# Patient Record
Sex: FEMALE | Race: ASIAN | NOT HISPANIC OR LATINO | Employment: UNEMPLOYED | URBAN - METROPOLITAN AREA
[De-identification: names, ages, dates, MRNs, and addresses within clinical notes are randomized per-mention and may not be internally consistent; named-entity substitution may affect disease eponyms.]

---

## 2021-06-03 ENCOUNTER — RECORDS - HEALTHEAST (OUTPATIENT)
Dept: ADMINISTRATIVE | Facility: CLINIC | Age: 46
End: 2021-06-03

## 2023-11-16 ENCOUNTER — OFFICE VISIT (OUTPATIENT)
Dept: FAMILY MEDICINE | Facility: CLINIC | Age: 48
End: 2023-11-16

## 2023-11-16 VITALS
TEMPERATURE: 98.1 F | HEIGHT: 61 IN | OXYGEN SATURATION: 99 % | HEART RATE: 91 BPM | RESPIRATION RATE: 16 BRPM | BODY MASS INDEX: 27.23 KG/M2 | SYSTOLIC BLOOD PRESSURE: 118 MMHG | DIASTOLIC BLOOD PRESSURE: 70 MMHG | WEIGHT: 144.25 LBS

## 2023-11-16 DIAGNOSIS — Z29.89 NEED FOR MALARIA PROPHYLAXIS: ICD-10-CM

## 2023-11-16 DIAGNOSIS — Z71.84 ENCOUNTER FOR COUNSELING FOR TRAVEL: ICD-10-CM

## 2023-11-16 DIAGNOSIS — Z00.00 HEALTH CARE MAINTENANCE: ICD-10-CM

## 2023-11-16 DIAGNOSIS — E66.3 OVERWEIGHT (BMI 25.0-29.9): ICD-10-CM

## 2023-11-16 DIAGNOSIS — Z23 NEED FOR VACCINATION: ICD-10-CM

## 2023-11-16 DIAGNOSIS — A09 TRAVELER'S DIARRHEA: ICD-10-CM

## 2023-11-16 DIAGNOSIS — Z76.89 ENCOUNTER TO ESTABLISH CARE: Primary | ICD-10-CM

## 2023-11-16 LAB
ALBUMIN SERPL BCG-MCNC: 4.1 G/DL (ref 3.5–5.2)
ALP SERPL-CCNC: 62 U/L (ref 40–150)
ALT SERPL W P-5'-P-CCNC: 38 U/L (ref 0–50)
ANION GAP SERPL CALCULATED.3IONS-SCNC: 9 MMOL/L (ref 7–15)
AST SERPL W P-5'-P-CCNC: 38 U/L (ref 0–45)
BASOPHILS # BLD AUTO: 0 10E3/UL (ref 0–0.2)
BASOPHILS NFR BLD AUTO: 1 %
BILIRUB DIRECT SERPL-MCNC: <0.2 MG/DL (ref 0–0.3)
BILIRUB SERPL-MCNC: 0.5 MG/DL
BUN SERPL-MCNC: 9.8 MG/DL (ref 6–20)
CALCIUM SERPL-MCNC: 9 MG/DL (ref 8.6–10)
CHLORIDE SERPL-SCNC: 105 MMOL/L (ref 98–107)
CHOLEST SERPL-MCNC: 229 MG/DL
CREAT SERPL-MCNC: 0.54 MG/DL (ref 0.51–0.95)
DEPRECATED HCO3 PLAS-SCNC: 24 MMOL/L (ref 22–29)
EGFRCR SERPLBLD CKD-EPI 2021: >90 ML/MIN/1.73M2
EOSINOPHIL # BLD AUTO: 0.2 10E3/UL (ref 0–0.7)
EOSINOPHIL NFR BLD AUTO: 4 %
ERYTHROCYTE [DISTWIDTH] IN BLOOD BY AUTOMATED COUNT: 13.2 % (ref 10–15)
GLUCOSE SERPL-MCNC: 87 MG/DL (ref 70–99)
HCT VFR BLD AUTO: 39.6 % (ref 35–47)
HDLC SERPL-MCNC: 53 MG/DL
HGB BLD-MCNC: 13.4 G/DL (ref 11.7–15.7)
IMM GRANULOCYTES # BLD: 0 10E3/UL
IMM GRANULOCYTES NFR BLD: 0 %
LDLC SERPL CALC-MCNC: 153 MG/DL
LYMPHOCYTES # BLD AUTO: 1.7 10E3/UL (ref 0.8–5.3)
LYMPHOCYTES NFR BLD AUTO: 36 %
MCH RBC QN AUTO: 30.6 PG (ref 26.5–33)
MCHC RBC AUTO-ENTMCNC: 33.8 G/DL (ref 31.5–36.5)
MCV RBC AUTO: 90 FL (ref 78–100)
MONOCYTES # BLD AUTO: 0.4 10E3/UL (ref 0–1.3)
MONOCYTES NFR BLD AUTO: 8 %
NEUTROPHILS # BLD AUTO: 2.5 10E3/UL (ref 1.6–8.3)
NEUTROPHILS NFR BLD AUTO: 52 %
NONHDLC SERPL-MCNC: 176 MG/DL
PLATELET # BLD AUTO: 229 10E3/UL (ref 150–450)
POTASSIUM SERPL-SCNC: 3.7 MMOL/L (ref 3.4–5.3)
PROT SERPL-MCNC: 7.9 G/DL (ref 6.4–8.3)
RBC # BLD AUTO: 4.38 10E6/UL (ref 3.8–5.2)
SODIUM SERPL-SCNC: 138 MMOL/L (ref 135–145)
TRIGL SERPL-MCNC: 116 MG/DL
TSH SERPL DL<=0.005 MIU/L-ACNC: 1.4 UIU/ML (ref 0.3–4.2)
WBC # BLD AUTO: 4.9 10E3/UL (ref 4–11)

## 2023-11-16 PROCEDURE — 90686 IIV4 VACC NO PRSV 0.5 ML IM: CPT | Performed by: FAMILY MEDICINE

## 2023-11-16 PROCEDURE — 82248 BILIRUBIN DIRECT: CPT | Performed by: FAMILY MEDICINE

## 2023-11-16 PROCEDURE — 90714 TD VACC NO PRESV 7 YRS+ IM: CPT | Performed by: FAMILY MEDICINE

## 2023-11-16 PROCEDURE — 84443 ASSAY THYROID STIM HORMONE: CPT | Performed by: FAMILY MEDICINE

## 2023-11-16 PROCEDURE — 90480 ADMN SARSCOV2 VAC 1/ONLY CMP: CPT | Performed by: FAMILY MEDICINE

## 2023-11-16 PROCEDURE — 90472 IMMUNIZATION ADMIN EACH ADD: CPT | Performed by: FAMILY MEDICINE

## 2023-11-16 PROCEDURE — 80061 LIPID PANEL: CPT | Performed by: FAMILY MEDICINE

## 2023-11-16 PROCEDURE — 90471 IMMUNIZATION ADMIN: CPT | Performed by: FAMILY MEDICINE

## 2023-11-16 PROCEDURE — 36415 COLL VENOUS BLD VENIPUNCTURE: CPT | Performed by: FAMILY MEDICINE

## 2023-11-16 PROCEDURE — 90691 TYPHOID VACCINE IM: CPT | Performed by: FAMILY MEDICINE

## 2023-11-16 PROCEDURE — 85025 COMPLETE CBC W/AUTO DIFF WBC: CPT | Performed by: FAMILY MEDICINE

## 2023-11-16 PROCEDURE — 99401 PREV MED CNSL INDIV APPRX 15: CPT | Mod: 25 | Performed by: FAMILY MEDICINE

## 2023-11-16 PROCEDURE — 91320 SARSCV2 VAC 30MCG TRS-SUC IM: CPT | Performed by: FAMILY MEDICINE

## 2023-11-16 PROCEDURE — 80053 COMPREHEN METABOLIC PANEL: CPT | Performed by: FAMILY MEDICINE

## 2023-11-16 RX ORDER — ATOVAQUONE AND PROGUANIL HYDROCHLORIDE 250; 100 MG/1; MG/1
1 TABLET, FILM COATED ORAL DAILY
Qty: 44 TABLET | Refills: 0 | Status: SHIPPED | OUTPATIENT
Start: 2023-11-17 | End: 2023-12-31

## 2023-11-16 RX ORDER — AZITHROMYCIN 250 MG/1
500 TABLET, FILM COATED ORAL DAILY
Qty: 24 TABLET | Refills: 0 | Status: SHIPPED | OUTPATIENT
Start: 2023-11-16

## 2023-11-16 NOTE — PROGRESS NOTES
OFFICE VISIT    Assessment/Plan:     Patient Instructions:    -Please take the medications as prescribed.  -Follow the recommendations as discussed.  -Reviewed the CDC travel guidelines as printed and given to you.    Please seek immediate medical attention (go to the emergency room or urgent care) for the following reasons: worsening symptoms, or any concerning changes.      Higniio was seen today for [facility] and travel clinic.  Diagnoses and all orders for this visit:    Encounter to establish care  Health care maintenance  Overweight (BMI 25.0-29.9)  -     Basic metabolic panel; Future  -     CBC with Platelets & Differential; Future  -     Hepatic function panel; Future  -     Lipid panel reflex to direct LDL Non-fasting; Future  -     TSH with free T4 reflex; Future    Encounter for counseling for travel  -     atovaquone-proguanil (MALARONE) 250-100 MG tablet; Take 1 tablet by mouth daily for 44 days Start 2 days before travel and continue 7 days after return.  -     azithromycin (ZITHROMAX) 250 MG tablet; Take 2 tablets (500 mg) by mouth daily For 3 days at onset of diarrhea.    Need for malaria prophylaxis  -     atovaquone-proguanil (MALARONE) 250-100 MG tablet; Take 1 tablet by mouth daily for 44 days Start 2 days before travel and continue 7 days after return.    Traveler's diarrhea  -     azithromycin (ZITHROMAX) 250 MG tablet; Take 2 tablets (500 mg) by mouth daily For 3 days at onset of diarrhea.    Need for vaccination  -     INFLUENZA VACCINE IM > 6 MONTHS VALENT IIV4 (AFLURIA/FLUZONE)  -     COVID-19 12+ (2023-24) (PFIZER)  -     TYPHOID VACCINE, IM  -     TD,PF 7+(TENIVAC)        Return in about 2 months (around 1/16/2024) for Annual Physical.    The diagnoses, treatment options, risk, benefits, and recommendations were reviewed with patient/guardian.  Questions were answered to patient's/guardian satisfaction.  Red flag signs were reviewed.  Patient/guardian is in agreement with above  plan.      Subjective: 48 year old female who does not see doctors often and otherwise healthy who presents to clinic for the following complaints:   Patient presents with:  Establish Care  Travel Clinic: [facility] 11/19/23 to 12/24/23    Patient presents with son.   Patient is originally from [facility] then moved to [facility] in 2009.   Moved to [place] in 2010. She saw Dr. Rivera 13 years ago and has not seen anyone else since then.       Medical history:   -Denies history of CAD, diabetes, hypertension.    Medications: None.    Surgical history:   -Denies previous surgery.      Family history:  -Denies family history of CAD, diabetes, hypertension.       They will be travelling to [facility] as above.  They will be going to [facility] in the [place].    They will be interacting with animals (maybe pigs, chickens, farm animals). Discussed animals that carry rabies and related precautions.     Discussed traveller's diarrhea, malaria, rabies.   Discussed recommendations as listed on the [facility] traveller's guide to patient's destination.     Reviewed routine vaccination recommendations.     HM due was reviewed with patient/parent.  Recommendations, risk, benefits were reviewed.  Accepted recommendations were ordered.  Otherwise, patient/parent declined.    Health Maintenance Due   Topic Date Due    YEARLY PREVENTIVE VISIT  Never done    ANNUAL REVIEW OF HM ORDERS  Never done    ADVANCE CARE PLANNING  Never done    MAMMO SCREENING  Never done    COLORECTAL CANCER SCREENING  Never done    HIV SCREENING  Never done    HEPATITIS C SCREENING  Never done    PAP  06/02/2013    DTAP/TDAP/TD IMMUNIZATION (3 - Td or Tdap) 04/26/2021    LIPID  Never done    INFLUENZA VACCINE (1) 09/01/2023    COVID-19 Vaccine (4 - 2023-24 season) 09/01/2023     Discussed typhoid.    Immunization History   Administered Date(s) Administered    COVID-19 Bivalent 12+ (Pfizer) 11/06/2022    COVID-19 Monovalent 18+ (Moderna) 06/10/2021, 07/08/2021  "   Flu, Unspecified 01/16/2011    HepA, Unspecified 08/07/2009, 02/18/2011    HepB, Unspecified 08/07/2009, 06/02/2010, 02/18/2011    MMR 08/07/2009, 06/02/2010    TDAP (Adacel,Boostrix) 06/02/2010    Td,adult,historic,unspecified 08/07/2009, 04/26/2011    Varicella 08/07/2009, 06/02/2010           Malaria prophylaxis recommendations: (based on location and strain of malaria)    https://wwwnc.cdc.gov/travel/yellowbook/2020/umdulzerc-efaujvpmkegbv-elscrdxkt/yellow-fever-vaccine-and-malaria-prophylaxis-information-by-country#ftyiuhe467      Patient presents with son, Renata. Declined professional  and elects to have Renata . Discussed with patient.     The 10 point review of system is negative except as stated in the HPI.    Allergies were reviewed and updated.    Objective:   /70   Pulse 91   Temp 98.1  F (36.7  C) (Oral)   Resp 16   Ht 1.547 m (5' 0.91\")   Wt 65.4 kg (144 lb 4 oz)   LMP 11/05/2023 (Approximate)   SpO2 99%   BMI 27.34 kg/m    General: Active, alert, nontoxic-appearing.  No acute distress.  HEENT: Normocephalic, atraumatic.  Pupils are equal and round.  Sclera is clear.  Normal external ears. Nares patent.  Moist mucous membranes.    Cardiac: RRR.  S1, S2 present.  No murmurs, rubs, or gallops.  Respiratory/chest: Clear to auscultation bilaterally.  No wheezes, rales, rhonchi.  Breathing is not labored.  No accessory muscle usage.  Abdomen: Soft, nondistended, nontender.  No masses or organomegaly noted.  No guarding or rebound tenderness appreciated.  Extremities: Voluntary movements intact.  Integumentary: No concerning rash or skin changes appreciated.    Amount of time spent in chart review, direct patient contact, care coordination, and related activities to patient care on the day of appointment related to travel counseling: 15 minutes.       Trevin Ballard MD  [facility] [place] [address]  Phone: [phone]  Fax: " [phone]    11/20/2023  7:00 AM              Current Outpatient Medications   Medication    [START ON 11/17/2023] atovaquone-proguanil (MALARONE) 250-100 MG tablet    azithromycin (ZITHROMAX) 250 MG tablet     No current facility-administered medications for this visit.       No Known Allergies    There are no problems to display for this patient.      No family history on file.    No past surgical history on file.     Social History     Socioeconomic History    Marital status:      Spouse name: Not on file    Number of children: Not on file    Years of education: Not on file    Highest education level: Not on file   Occupational History    Not on file   Tobacco Use    Smoking status: Never     Passive exposure: Never    Smokeless tobacco: Never   Vaping Use    Vaping Use: Never used   Substance and Sexual Activity    Alcohol use: Not on file    Drug use: Not on file    Sexual activity: Not on file   Other Topics Concern    Not on file   Social History Narrative    Not on file     Social Determinants of Health     Financial Resource Strain: Low Risk  (11/16/2023)    Financial Resource Strain     Within the past 12 months, have you or your family members you live with been unable to get utilities (heat, electricity) when it was really needed?: No   Food Insecurity: Low Risk  (11/16/2023)    Food Insecurity     Within the past 12 months, did you worry that your food would run out before you got money to buy more?: No     Within the past 12 months, did the food you bought just not last and you didn t have money to get more?: No   Transportation Needs: Low Risk  (11/16/2023)    Transportation Needs     Within the past 12 months, has lack of transportation kept you from medical appointments, getting your medicines, non-medical meetings or appointments, work, or from getting things that you need?: No   Physical Activity: Not on file   Stress: Not on file   Social Connections: Not on file   Interpersonal Safety:  Not on file   Housing Stability: Low Risk  (11/16/2023)    Housing Stability     Do you have housing? : Yes     Are you worried about losing your housing?: No       Answers submitted by the patient for this visit:  General Questionnaire (Submitted on 11/16/2023)  Chief Complaint: Chronic problems general questions HPI Form  What is the reason for your visit today? : pretravel  How many servings of fruits and vegetables do you eat daily?: 0-1  On average, how many sweetened beverages do you drink each day (Examples: soda, juice, sweet tea, etc.  Do NOT count diet or artificially sweetened beverages)?: 0  How many minutes a day do you exercise enough to make your heart beat faster?: 9 or less  How many days a week do you exercise enough to make your heart beat faster?: 3 or less  How many days per week do you miss taking your medication?: 0    Prior to immunization administration, verified patients identity using patient s name and date of birth. Please see Immunization Activity for additional information.     Screening Questionnaire for Adult Immunization    Are you sick today?   No   Do you have allergies to medications, food, a vaccine component or latex?   No   Have you ever had a serious reaction after receiving a vaccination?   No   Do you have a long-term health problem with heart, lung, kidney, or metabolic disease (e.g., diabetes), asthma, a blood disorder, no spleen, complement component deficiency, a cochlear implant, or a spinal fluid leak?  Are you on long-term aspirin therapy?   No   Do you have cancer, leukemia, HIV/AIDS, or any other immune system problem?   No   Do you have a parent, brother, or sister with an immune system problem?   No   In the past 3 months, have you taken medications that affect  your immune system, such as prednisone, other steroids, or anticancer drugs; drugs for the treatment of rheumatoid arthritis, Crohn s disease, or psoriasis; or have you had radiation treatments?   No    Have you had a seizure, or a brain or other nervous system problem?   No   During the past year, have you received a transfusion of blood or blood    products, or been given immune (gamma) globulin or antiviral drug?   No   For women: Are you pregnant or is there a chance you could become       pregnant during the next month?   No   Have you received any vaccinations in the past 4 weeks?   No     Immunization questionnaire answers were all negative.      Patient instructed to remain in clinic for 15 minutes afterwards, and to report any adverse reactions.     Screening performed by Kathy Gilbert MA on 11/16/2023 at 11:21 AM.

## 2023-11-16 NOTE — PATIENT INSTRUCTIONS
-Thank you for choosing the [facility].  -It was a pleasure to see you today.  -Please take a look at the information below for more specific details regarding the treatment plan and recommendations.  -In this after visit summary is a list of your medications and specific instructions.  Please review this carefully as there may be changes made to your medication list.  -If there are any particular questions or concerns, please feel free to reach out to Dr. Ballard.  -If any labs have been completed, we will reach out to you about results.  If the results are normal or not concerning, a letter or [employer] message will be sent to you.  If any follow-up is needed, either Dr. Ballard or the nurse will give you a call.  If you have not heard regarding results after 2 weeks, please reach out to the clinic.    Patient Instructions:    -Please take the medications as prescribed.  -Follow the recommendations as discussed.  -Reviewed the CDC travel guidelines as printed and given to you.    Please seek immediate medical attention (go to the emergency room or urgent care) for the following reasons: worsening symptoms, or any concerning changes.      --------------------------------------------------------------------------------------------------------------------    -We are always looking for ways to improve.  You may be selected to receive a survey regarding your visit today.  We encourage you to complete the survey and provide specific, constructive feedback to help us improve our processes.  Thank you for your time!  -Please review the contact information listed on the after visit summary and in the electronic chart.  Below is the phone number that we have on file.  If there are any changes that are needed to be made, please reach out to the clinic.  [phone] (home)

## 2023-11-20 ENCOUNTER — TELEPHONE (OUTPATIENT)
Dept: FAMILY MEDICINE | Facility: CLINIC | Age: 48
End: 2023-11-20

## 2023-11-20 NOTE — TELEPHONE ENCOUNTER
----- Message from Trevin Ballard MD sent at 11/20/2023 10:38 AM CST -----  Team - please call patient with results.    Omar Snell,    I hope you have been well since our last visit. Below are the results from the testing completed at the visit.     The total cholesterol and LDL or bad cholesterol are high the triglycerides, which is primarily affected by food, is normal. The HDL or good cholesterol are in the normal range.  No medications are recommended at this time, though you should try to follow a diet that is rich in fruits and vegetables and low in fats and cholesterol.  In addition, find ways to stay active.  Doing aerobic activities (such as running, biking, etc.) will help improve the HDL or good cholesterol.  Weight loss is also recommended.    The rest of your lab tests were in the good range.    Dr. Ballard wishes you safe travels and recommends that you continue on the plan as discussed in clinic.    If there are any questions or concerns, please call the clinic or schedule an appointment for follow up.     Best wishes,           Trevin Ballard MD  [facility]  11/20/2023  10:36 AM    The 10-year ASCVD risk score (Daniel SINGH, et al., 2019) is: 1%    Values used to calculate the score:      Age: 48 years      Sex: Female      Is Non- : No      Diabetic: No      Tobacco smoker: No      Systolic Blood Pressure: 118 mmHg      Is BP treated: No      HDL Cholesterol: 53 mg/dL      Total Cholesterol: 229 mg/dL

## 2023-11-20 NOTE — LETTER
"December 1, 2023      Phre Channing  [address] [place] [address]        Dear ,    We are writing to inform you of your test results.    Test results indicate you may require additional follow up, see comment below.    \"The total cholesterol and LDL or bad cholesterol are high the triglycerides, which is primarily affected by food, is normal. The HDL or good cholesterol are in the normal range.  No medications are recommended at this time, though you should try to follow a diet that is rich in fruits and vegetables and low in fats and cholesterol.  In addition, find ways to stay active.  Doing aerobic activities (such as running, biking, etc.) will help improve the HDL or good cholesterol.  Weight loss is also recommended.     The rest of your lab tests were in the good range.\"    Resulted Orders   Basic metabolic panel   Result Value Ref Range    Sodium 138 135 - 145 mmol/L      Comment:      Reference intervals for this test were updated on 09/26/2023 to more accurately reflect our healthy population. There may be differences in the flagging of prior results with similar values performed with this method. Interpretation of those prior results can be made in the context of the updated reference intervals.     Potassium 3.7 3.4 - 5.3 mmol/L    Chloride 105 98 - 107 mmol/L    Carbon Dioxide (CO2) 24 22 - 29 mmol/L    Anion Gap 9 7 - 15 mmol/L    Urea Nitrogen 9.8 6.0 - 20.0 mg/dL    Creatinine 0.54 0.51 - 0.95 mg/dL    GFR Estimate >90 >60 mL/min/1.73m2    Calcium 9.0 8.6 - 10.0 mg/dL    Glucose 87 70 - 99 mg/dL   Hepatic function panel   Result Value Ref Range    Protein Total 7.9 6.4 - 8.3 g/dL    Albumin 4.1 3.5 - 5.2 g/dL    Bilirubin Total 0.5 <=1.2 mg/dL    Alkaline Phosphatase 62 40 - 150 U/L      Comment:      Reference intervals for this test were updated on 11/14/2023 to more accurately reflect our healthy population. There may be differences in the flagging of prior results with similar values " performed with this method. Interpretation of those prior results can be made in the context of the updated reference intervals.    AST 38 0 - 45 U/L      Comment:      Reference intervals for this test were updated on 6/12/2023 to more accurately reflect our healthy population. There may be differences in the flagging of prior results with similar values performed with this method. Interpretation of those prior results can be made in the context of the updated reference intervals.    ALT 38 0 - 50 U/L      Comment:      Reference intervals for this test were updated on 6/12/2023 to more accurately reflect our healthy population. There may be differences in the flagging of prior results with similar values performed with this method. Interpretation of those prior results can be made in the context of the updated reference intervals.      Bilirubin Direct <0.20 0.00 - 0.30 mg/dL   Lipid panel reflex to direct LDL Non-fasting   Result Value Ref Range    Cholesterol 229 (H) <200 mg/dL    Triglycerides 116 <150 mg/dL    Direct Measure HDL 53 >=50 mg/dL    LDL Cholesterol Calculated 153 (H) <=100 mg/dL    Non HDL Cholesterol 176 (H) <130 mg/dL    Narrative    Cholesterol  Desirable:  <200 mg/dL    Triglycerides  Normal:  Less than 150 mg/dL  Borderline High:  150-199 mg/dL  High:  200-499 mg/dL  Very High:  Greater than or equal to 500 mg/dL    Direct Measure HDL  Female:  Greater than or equal to 50 mg/dL   Male:  Greater than or equal to 40 mg/dL    LDL Cholesterol  Desirable:  <100mg/dL  Above Desirable:  100-129 mg/dL   Borderline High:  130-159 mg/dL   High:  160-189 mg/dL   Very High:  >= 190 mg/dL    Non HDL Cholesterol  Desirable:  130 mg/dL  Above Desirable:  130-159 mg/dL  Borderline High:  160-189 mg/dL  High:  190-219 mg/dL  Very High:  Greater than or equal to 220 mg/dL   TSH with free T4 reflex   Result Value Ref Range    TSH 1.40 0.30 - 4.20 uIU/mL   CBC with platelets and differential   Result Value Ref  Range    WBC Count 4.9 4.0 - 11.0 10e3/uL    RBC Count 4.38 3.80 - 5.20 10e6/uL    Hemoglobin 13.4 11.7 - 15.7 g/dL    Hematocrit 39.6 35.0 - 47.0 %    MCV 90 78 - 100 fL    MCH 30.6 26.5 - 33.0 pg    MCHC 33.8 31.5 - 36.5 g/dL    RDW 13.2 10.0 - 15.0 %    Platelet Count 229 150 - 450 10e3/uL    % Neutrophils 52 %    % Lymphocytes 36 %    % Monocytes 8 %    % Eosinophils 4 %    % Basophils 1 %    % Immature Granulocytes 0 %    Absolute Neutrophils 2.5 1.6 - 8.3 10e3/uL    Absolute Lymphocytes 1.7 0.8 - 5.3 10e3/uL    Absolute Monocytes 0.4 0.0 - 1.3 10e3/uL    Absolute Eosinophils 0.2 0.0 - 0.7 10e3/uL    Absolute Basophils 0.0 0.0 - 0.2 10e3/uL    Absolute Immature Granulocytes 0.0 <=0.4 10e3/uL       If you have any questions or concerns, please call the clinic at the number listed above.       Sincerely,        Dr. Ballard

## 2023-11-22 NOTE — TELEPHONE ENCOUNTER
CMT left message x 2. Please review message thread below and advise the patient as indicated. Please schedule if necessary or indicated in message thread.

## 2023-12-01 NOTE — TELEPHONE ENCOUNTER
"Writer attempt #3 to call patient with the help of a \"Krysta\"  regarding provider's message below. No answer, left non-detailed voicemail, with clinic call back number.     Third attempt, letter will be sent.    Closing encounter.    MEEK CadetN, RN   [facility]    "